# Patient Record
Sex: FEMALE | Race: WHITE | NOT HISPANIC OR LATINO | URBAN - NONMETROPOLITAN AREA
[De-identification: names, ages, dates, MRNs, and addresses within clinical notes are randomized per-mention and may not be internally consistent; named-entity substitution may affect disease eponyms.]

---

## 2024-11-12 ENCOUNTER — OFFICE VISIT (OUTPATIENT)
Dept: URBAN - NONMETROPOLITAN AREA CLINIC 4 | Facility: CLINIC | Age: 79
End: 2024-11-12

## 2024-11-15 ENCOUNTER — LAB OUTSIDE AN ENCOUNTER (OUTPATIENT)
Dept: URBAN - NONMETROPOLITAN AREA CLINIC 4 | Facility: CLINIC | Age: 79
End: 2024-11-15

## 2024-11-15 ENCOUNTER — OFFICE VISIT (OUTPATIENT)
Dept: URBAN - NONMETROPOLITAN AREA CLINIC 4 | Facility: CLINIC | Age: 79
End: 2024-11-15
Payer: MEDICARE

## 2024-11-15 ENCOUNTER — DASHBOARD ENCOUNTERS (OUTPATIENT)
Age: 79
End: 2024-11-15

## 2024-11-15 VITALS
DIASTOLIC BLOOD PRESSURE: 76 MMHG | BODY MASS INDEX: 42.99 KG/M2 | TEMPERATURE: 98 F | HEART RATE: 98 BPM | WEIGHT: 258 LBS | HEIGHT: 65 IN | SYSTOLIC BLOOD PRESSURE: 115 MMHG

## 2024-11-15 DIAGNOSIS — I48.0 PAROXYSMAL A-FIB: ICD-10-CM

## 2024-11-15 DIAGNOSIS — Z99.81 OXYGEN DEPENDENT: ICD-10-CM

## 2024-11-15 DIAGNOSIS — K59.09 CHRONIC CONSTIPATION: ICD-10-CM

## 2024-11-15 DIAGNOSIS — Z79.01 CHRONIC ANTICOAGULATION: ICD-10-CM

## 2024-11-15 DIAGNOSIS — J44.9 CHRONIC OBSTRUCTIVE PULMONARY DISEASE, UNSPECIFIED COPD TYPE: ICD-10-CM

## 2024-11-15 DIAGNOSIS — Z95.0 PACEMAKER: ICD-10-CM

## 2024-11-15 DIAGNOSIS — D50.9 IRON DEFICIENCY ANEMIA, UNSPECIFIED IRON DEFICIENCY ANEMIA TYPE: ICD-10-CM

## 2024-11-15 PROBLEM — 282825002: Status: ACTIVE | Noted: 2024-11-15

## 2024-11-15 PROBLEM — 931000119107: Status: ACTIVE | Noted: 2024-11-15

## 2024-11-15 PROBLEM — 441509002: Status: ACTIVE | Noted: 2024-11-15

## 2024-11-15 PROBLEM — 711150003: Status: ACTIVE | Noted: 2024-11-15

## 2024-11-15 PROBLEM — 13645005: Status: ACTIVE | Noted: 2024-11-15

## 2024-11-15 PROBLEM — 87522002: Status: ACTIVE | Noted: 2024-11-15

## 2024-11-15 PROCEDURE — 99204 OFFICE O/P NEW MOD 45 MIN: CPT | Performed by: REGISTERED NURSE

## 2024-11-15 NOTE — HPI-TODAY'S VISIT:
11/15/24: Pt is a 80 yo female with PMH of HTN, chronic bronchitis, GHADA, Afib (on Eliquis), CHF, pacemaker who was referred by Dr. 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  A copy of this document will be sent to the referring physician.   Pt was admitted to [facility] from 10/23 through 10/28/24 for acute on chronic respiratory failure with hypoxia despite 4 L home oxygen, found to have what appears to be predominantly acute on chronic diastolic heart failure exacerbation, no significant COPD exacerbation noted although they appear to have treated her for both with diuresis and steroids, antibiotics.   Per labs 10/28/24, Hgb 8.1, Hct 28.9, MCV 85.5, MCH 24. Per labs 10/23/24, Iron 27, iron sat 7%, TIBC 404, ferritin 29. Received IV Venofer while hospitalized.  Patient has had a colonoscopy several years ago that revealed polyps. There is no family history of colon polyps or cancer. She reports chronic constipation issues sometimes going 3 to 4 days without any bowel movements. She is taking Senokot, but has not started taking MiraLAX yet. Patient denies bleeding per rectum. She takes Eliquis for Afib. Follows cardiologsit, Dr. Misael Donnelly. Follows pulmonoogist, Dr. Bc Ngo

## 2025-06-27 ENCOUNTER — TELEPHONE ENCOUNTER (OUTPATIENT)
Dept: URBAN - NONMETROPOLITAN AREA CLINIC 4 | Facility: CLINIC | Age: 80
End: 2025-06-27

## 2025-07-25 ENCOUNTER — TELEPHONE ENCOUNTER (OUTPATIENT)
Dept: URBAN - NONMETROPOLITAN AREA CLINIC 4 | Facility: CLINIC | Age: 80
End: 2025-07-25

## 2025-07-25 RX ORDER — SODIUM PICOSULFATE, MAGNESIUM OXIDE, AND ANHYDROUS CITRIC ACID 12; 3.5; 1 G/175ML; G/175ML; MG/175ML
175 ML THE FIRST DOSE THE EVENING BEFORE AND SECOND DOSE THE MORNING OF COLONOSCOPY LIQUID ORAL ONCE A DAY
Qty: 350 | OUTPATIENT
Start: 2025-07-25 | End: 2025-07-27